# Patient Record
Sex: FEMALE
[De-identification: names, ages, dates, MRNs, and addresses within clinical notes are randomized per-mention and may not be internally consistent; named-entity substitution may affect disease eponyms.]

---

## 2019-07-10 ENCOUNTER — HOSPITAL ENCOUNTER (EMERGENCY)
Dept: HOSPITAL 38 - CC.ED | Age: 59
Discharge: HOME | End: 2019-07-10
Payer: COMMERCIAL

## 2019-07-10 VITALS — DIASTOLIC BLOOD PRESSURE: 67 MMHG | HEART RATE: 103 BPM | SYSTOLIC BLOOD PRESSURE: 104 MMHG

## 2019-07-10 DIAGNOSIS — N39.0: Primary | ICD-10-CM

## 2019-07-10 DIAGNOSIS — Z79.4: ICD-10-CM

## 2019-07-10 DIAGNOSIS — I10: ICD-10-CM

## 2019-07-10 DIAGNOSIS — K21.9: ICD-10-CM

## 2019-07-10 DIAGNOSIS — E03.9: ICD-10-CM

## 2019-07-10 DIAGNOSIS — Z88.5: ICD-10-CM

## 2019-07-10 DIAGNOSIS — Z79.899: ICD-10-CM

## 2019-07-10 DIAGNOSIS — E11.9: ICD-10-CM

## 2019-07-10 LAB
CHLORIDE SERPL-SCNC: 100 MEQ/L (ref 98–106)
SODIUM SERPL-SCNC: 136 MEQ/L (ref 136–145)

## 2019-07-10 PROCEDURE — 99283 EMERGENCY DEPT VISIT LOW MDM: CPT

## 2019-07-10 PROCEDURE — 96361 HYDRATE IV INFUSION ADD-ON: CPT

## 2019-07-10 PROCEDURE — 87186 SC STD MICRODIL/AGAR DIL: CPT

## 2019-07-10 PROCEDURE — 80053 COMPREHEN METABOLIC PANEL: CPT

## 2019-07-10 PROCEDURE — 81001 URINALYSIS AUTO W/SCOPE: CPT

## 2019-07-10 PROCEDURE — 85025 COMPLETE CBC W/AUTO DIFF WBC: CPT

## 2019-07-10 PROCEDURE — 87088 URINE BACTERIA CULTURE: CPT

## 2019-07-10 PROCEDURE — 96365 THER/PROPH/DIAG IV INF INIT: CPT

## 2019-07-10 PROCEDURE — 86140 C-REACTIVE PROTEIN: CPT

## 2019-07-10 PROCEDURE — 87086 URINE CULTURE/COLONY COUNT: CPT

## 2019-07-10 PROCEDURE — 36415 COLL VENOUS BLD VENIPUNCTURE: CPT

## 2019-07-10 PROCEDURE — A4217 STERILE WATER/SALINE, 500 ML: HCPCS

## 2019-07-10 NOTE — EDM.PDOC
ED HPI GENERAL MEDICAL PROBLEM





- General


Chief Complaint: General


Stated Complaint: BLEEDING DURING URINATION/LOWER ABD PAIN


Time Seen by Provider: 07/10/19 14:15


Source of Information: Reports: Patient


History Limitations: Reports: No Limitations





- History of Present Illness


INITIAL COMMENTS - FREE TEXT/NARRATIVE: 





Patient presents to ER with complaints of lower quadrant abdominal pain, 

dysuria with notable hematuria.  Noted burning with urination started about 

1000 this am and over the course of the last 3 hours, has had increasing 

discomfort and more blood.  Voiding frequently.  No nausea.  Does have 

generalized malaise.  Unaware of any fevers.  Denies back pain, fevers or 

headache.  Had EGD done yesterday with a monitor placed to evaluate pH of her 

stomach due to uncontrolled reflux and burning.  States due to this, unable to 

take NSAIDs which she usually takes for discomfort.  


Onset: Today, Sudden


Duration: Hour(s):


Location: Reports: Abdomen


Quality: Reports: Ache, Sharp


Severity: Moderate


Associated Symptoms: Denies: Confusion, Chest Pain, Fever/Chills, Headaches, 

Loss of Appetite, Nausea/Vomiting, Shortness of Breath


  ** Groin


Pain Score (Numeric/FACES): 5





- Related Data


 Allergies











Allergy/AdvReac Type Severity Reaction Status Date / Time


 


codeine Allergy Unknown Cannot Verified 03/17/17 14:43





   Remember  











Home Meds: 


 Home Meds





Albuterol [Ventolin HFA] 1 puff INH BID 12/19/14 [History]


Albuterol [Ventolin HFA] 1 puff INH Q8H PRN 12/19/14 [History]


Bisacodyl [Dulcolax] 5 mg PO DAILY 12/19/14 [History]


Budesonide/Formoterol [Symbicort 160-4.5 Mcg Inhaler] 2 puff INH BID 12/19/14 [

History]


Ferrous Sulfate [Iron] 65 mg PO DAILY 12/19/14 [History]


Levothyroxine [Synthroid] 100 mcg PO DAILY 12/19/14 [History]


Lisinopril 30 mg PO DAILY 12/19/14 [History]


MV,Ca,Min/Iron Fum/FA/Vit K [Multi For Her Tablet] 1 tab PO DAILY 12/19/14 [

History]


Mometasone Furoate [Nasonex Spray] 1 squirt NASBOTH BID 12/19/14 [History]


Omeprazole 20 mg PO DAILY 12/19/14 [History]


Potassium Chloride 10 meq PO DAILY 12/19/14 [History]


Triamcinolone Acetonide [Triamcinolone Acetonide 0.1% Crm] 1 applic TOP BID PRN 

12/19/14 [History]


Venlafaxine HCl [Venlafaxine ER] 150 mg PO DAILY 12/19/14 [History]


metFORMIN [Glucophage] 1,000 mg PO BID 12/19/14 [History]


Colestipol HCl 50 mg PO ASDIRECTED 08/08/16 [History]


Insulin Glargine,Hum.Rec.Anlog [Toujeo Solostar] 20 unit SQ DAILY 08/08/16 [

History]


Montelukast Sodium [Singulair] 100 mg PO DAILY 08/08/16 [History]


Prednisone [IJD: Prednisone] 3 mg PO DAILY 08/08/16 [History]











Past Medical History


HEENT History: Reports: Allergic Rhinitis


Cardiovascular History: Reports: Hypertension


Respiratory History: Reports: Asthma


Gastrointestinal History: Reports: GERD


Endocrine/Metabolic History: Reports: Diabetes, Type II, Hypothyroidism


Oncologic (Cancer) History: Reports: Breast





- Infectious Disease History


Infectious Disease History: Reports: None





- Past Surgical History


HEENT Surgical History: Reports: LASIK


GI Surgical History: Reports: Cholecystectomy


Female  Surgical History: Reports: Breast Biopsy, D&C, Mastectomy





Social & Family History





- Family History


Family Medical History: Noncontributory





- Tobacco Use


Smoking Status *Q: Never Smoker


Second Hand Smoke Exposure: No





ED ROS GENERAL





- Review of Systems


Review Of Systems: See Below


Constitutional: Reports: Malaise.  Denies: Fever, Chills, Weakness, Decreased 

Appetite


HEENT: Reports: No Symptoms


Respiratory: Denies: Shortness of Breath, Cough


Cardiovascular: Denies: Chest Pain, Edema, Lightheadedness


Endocrine: Reports: Fatigue


GI/Abdominal: Reports: Abdominal Pain.  Denies: Constipation, Diarrhea, Nausea


: Reports: Dysuria, Frequency, Hematuria, Urgency


Musculoskeletal: Reports: No Symptoms


Skin: Reports: No Symptoms


Neurological: Reports: No Symptoms


Psychiatric: Reports: No Symptoms





ED EXAM, GENERAL





- Physical Exam


Exam: See Below


Exam Limited By: No Limitations


General Appearance: Alert, WD/WN, No Apparent Distress


Ears: Normal External Exam, Normal TMs


Nose: Normal Inspection, Normal Mucosa, No Blood


Throat/Mouth: Normal Inspection, Normal Oropharynx


Head: Normocephalic


Neck: Normal Inspection, Supple, Non-Tender


Respiratory/Chest: No Respiratory Distress, Lungs Clear, Normal Breath Sounds


Cardiovascular: Regular Rate, Rhythm


GI/Abdominal: Normal Bowel Sounds, Soft, Tender (lower quadrants/suprapubic area

)


Extremities: Normal Inspection, No Pedal Edema


Neurological: Alert, Oriented


Skin Exam: Warm, Dry





Course





- Vital Signs


Last Recorded V/S: 


 Last Vital Signs











Temp  97.5 F   07/10/19 15:55


 


Pulse  103 H  07/10/19 15:55


 


Resp  18   07/10/19 15:55


 


BP  104/67   07/10/19 15:55


 


Pulse Ox  97   07/10/19 15:55














- Orders/Labs/Meds


Orders: 


 Active Orders 24 hr











 Category Date Time Status


 


 CULTURE URINE [RM] Stat Lab  07/10/19 14:10 Received











Labs: 


 Laboratory Tests











  07/10/19 07/10/19 07/10/19 Range/Units





  14:10 14:15 14:15 


 


WBC   18.9 H   (5.0-10.0)  10^3/uL


 


RBC   5.20   (4.00-5.50)  10^6/uL


 


Hgb   15.1   (12.0-16.0)  g/dL


 


Hct   44.7   (37.0-47.0)  %


 


MCV   86.0   (82.0-94.0)  fL


 


MCH   29.0   (27.0-32.0)  pg


 


MCHC   33.8   (33.0-38.0)  g/dL


 


RDW Coeff of Sonia   13.7   (11.0-15.0)  %


 


Plt Count   354   (150-400)  10^3/uL


 


Neut % (Auto)   84.1   (35-85)  %


 


Lymph % (Auto)   9.6 L   (10-55)  %


 


Mono % (Auto)   4.9   (0-16)  %


 


Eos % (Auto)   1.2   (0-5)  %


 


Baso % (Auto)   0.2   (0-3)  %


 


Neut # (Auto)   15.91 H   (1.80-7.00)  10^3/uL


 


Lymph # (Auto)   1.81   (1.00-4.80)  10^3/uL


 


Mono # (Auto)   0.93 H   (0.00-0.80)  10^3/uL


 


Eos # (Auto)   0.22   (0.00-0.45)  10^3/uL


 


Baso # (Auto)   0.03   10^3/uL


 


Sodium    136  (136-145)  mEq/L


 


Potassium    4.6  (3.5-5.0)  mEq/L


 


Chloride    100  ()  mEq/L


 


Carbon Dioxide    24  (21-32)  mmol/L


 


BUN    12  (7-18)  mg/dL


 


Creatinine    0.9  (0.6-1.0)  mg/dL


 


Est Cr Clr Drug Dosing    TNP  


 


Estimated GFR (MDRD)    > 60  (>=60)  mL/min


 


Glucose    206 H D  (75-99)  mg/dL


 


Calcium    10.0  (8.4-10.1)  mg/dL


 


Total Bilirubin    0.4  (0.0-1.0)  mg/dL


 


AST    23  (15-37)  U/L


 


ALT    37  (12-78)  U/L


 


Alkaline Phosphatase    92  ()  U/L


 


C-Reactive Protein    < 0.2 L  (0.2-0.8)  mg/dL


 


Total Protein    7.9  (6.4-8.2)  g/dL


 


Albumin    3.9  (3.4-5.0)  g/dL


 


Urine Color  Red    (YELLOW)  


 


Urine Appearance  Cloudy    (CLEAR)  


 


Urine pH  8.5 H    (4.5-8.0)  


 


Ur Specific Gravity  1.020    (1.003-1.020)  


 


Urine Protein  >=300 H    (NEGATIVE)  mg/dL


 


Urine Glucose (UA)  100 H    (NEGATIVE)  mg/dL


 


Urine Ketones  40 H    (NEGATIVE)  mg/dL


 


Urine Occult Blood  Large H    (NEGATIVE)  


 


Urine Nitrite  Positive H    (NEGATIVE)  


 


Urine Bilirubin  Negative    (NEGATIVE)  


 


Urine Urobilinogen  4.0 H    (0.2-1.0)  EU/dL


 


Ur Leukocyte Esterase  Large H    (NEGATIVE)  


 


Urine RBC  Packed H    (0-5)  /HPF


 


Urine WBC  Semi-packed H    (0-5)  /HPF


 


Ur Squamous Epith Cells  Few H    (NOT SEEN)  /HPF


 


Urine Bacteria  Moderate H    (NOT SEEN)  /HPF


 


Urinalysis Comment      











Meds: 


Medications














Discontinued Medications














Generic Name Dose Route Start Last Admin





  Trade Name Freq  PRN Reason Stop Dose Admin


 


Levofloxacin/Dextrose 500 mg/  100 mls @ 100 mls/hr  07/10/19 14:53  07/10/19 15

:11





  Premix  IV  07/10/19 15:52  100 mls/hr





  ONETIME ONE   Administration





     





     





     





     


 


Sodium Chloride  1,000 mls @ 150 mls/hr  07/10/19 15:00  07/10/19 15:11





  Normal Saline  IV   150 mls/hr





  ASDIRECTED GRADY   Administration





     





     





     





     


 


Tramadol HCl  50 mg  07/10/19 14:48  07/10/19 15:11





  Ultram  PO  07/10/19 14:49  50 mg





  ONETIME ONE   Administration





     





     





     





     














- Re-Assessments/Exams


Free Text/Narrative Re-Assessment/Exam: 





07/10/19


WBC high 18.9, CRP negative.  Electrolytes, BUN and creatinine normal.  Urine 

is packed with bacteria, leukocytes, blood.  IV started.  Given first dose of 

IV Levaquin.





1700-Patient feeling better.  Was given tramadol for pain.  Discharge home, 

continue on Levaquin PO daily.  





Departure





- Departure


Time of Disposition: 16:54


Disposition: Home, Self-Care 01


Condition: Fair


Clinical Impression: 


 UTI, Urinary tract infectious disease








- Discharge Information


*PRESCRIPTION DRUG MONITORING PROGRAM REVIEWED*: No


*COPY OF PRESCRIPTION DRUG MONITORING REPORT IN PATIENT CASI: No


Referrals: 


Butch Batista MD [Primary Care Provider] - 


Forms:  ED Department Discharge


Additional Instructions: 


1.  Push fluids


2.  Tylenol for discomfort


3.  Levaquin 500 mg daily with food


4.  Recheck urine in 14 days after completion of treatment.


5.  Follow up with primary care provider for ongoing concerns.





- My Orders


Last 24 Hours: 


My Active Orders





07/10/19 14:10


CULTURE URINE [RM] Stat 














- Assessment/Plan


Last 24 Hours: 


My Active Orders





07/10/19 14:10


CULTURE URINE [RM] Stat

## 2020-08-28 NOTE — OR
DATE OF OPERATION:  08/28/2020

 

PREOPERATIVE DIAGNOSIS:  HISTORY OF POLYPS.

 

POSTOPERATIVE DIAGNOSIS:  HISTORY OF POLYPS.

 

SURGEON:  Butch Batista MD

 

PROCEDURE:  FULL-LENGTH COLONOSCOPY WITH SNARE POLYPECTOMY X4, FORCEPS POLYP

REMOVAL X6.

 

ANESTHESIA:  MAC.

 

COMPLICATIONS:  None.

 

SPECIMEN:  Ten separate polyps, see report.

 

FINDINGS:

1. Full-length colonoscopy.

2. Mild pan diverticulosis.

3. Tubular adenomas x6.

4. Villous adenomas x4.

 

RECOMMENDATIONS:  Followup colonoscopy in 1 year.

 

INDICATIONS:  The patient has a distant history of prior polyps, is overdue for

a followup colonoscopy.

 

DESCRIPTION OF PROCEDURE:  The patient was prepped and draped, placed in the

left lateral decubitus position.  A lubricated Olympus colonoscope was inserted

and easily advanced to the cecum. We were able to directly visualize the

ileocecal valve, palpate and visualize the light in the right lower quadrant.

The bowel prep was adequate.  Upon withdrawal, the cecal pouch appeared benign.

On the backside of the cecal pouch, the patient had 3 separate polyps, 2 were

small tubular adenomas, 1 was a large villous lesion.  The 2 tubular ones were

removed with a forceps.  The 3rd one was removed in 2 separate pieces via snare

and suctioned into polyp trap.  In the mid ascending colon, the patient had a

2nd larger villous lesion also removed with a snare without any complication.

Just before the hepatic flexure in the distal ascending colon, the patient had a

5th right-sided polyp removed with a forceps without any complication.  Most of

the transverse colon appeared benign.  Right past the splenic flexure, the

patient had 2 polyps, each removed with snare without any difficulty.  Both were

suctioned into polyp traps without complication.  The descending colon otherwise

was benign.  In the mid sigmoid area around 50 cm, the patient had a cluster of

3 small sessile tubular adenomas, each were removed with a forceps without any

difficulty.  As per the notes, the patient does have pan diverticular disease,

most significant in the sigmoid, but mild throughout.  The rectal vault appeared

benign.  Retroflexion of the scope showed no anal lesions.  Air was suctioned,

scope removed without complication.

 

ENRICO/NURIA

DD:  08/28/2020 09:38:43

DT:  08/28/2020 11:00:35

Job #:  [id number]

## 2021-10-01 NOTE — OR
DATE OF OPERATION:  10/01/2021

 

PREOPERATIVE DIAGNOSIS:  HISTORY OF POLYPS.

 

POSTOPERATIVE DIAGNOSIS:  HISTORY OF POLYPS.

 

SURGEON:  Butch Batista MD

 

PROCEDURE:  FULL-LENGTH DIAGNOSTIC COLONOSCOPY.

 

ANESTHESIA:  MAC.

 

COMPLICATIONS:  None.

 

SPECIMEN:  None.

 

FINDINGS:

1. Full-length colonoscopy.

2. Mild pandiverticulosis.

3. No polyp recurrence.

 

RECOMMENDATIONS:  Followup colonoscopy in 3 years.

 

INDICATIONS:  The patient had a colonoscopy last year with 10 polyps removed.

She is in for a 1-year followup.

 

DESCRIPTION OF PROCEDURE:  The patient was prepped and draped, placed in the

left lateral decubitus position.  A lubricated Olympus colonoscope was inserted

and easily advanced to the cecum. Direct visualization of the ileocecal valve

and appendiceal orifice was accomplished.  The bowel prep was adequate.  Upon

withdrawal of the scope throughout the entire length of the colon, I could find

no polyps, masses, ulceration, or bleeding sites.  No vascular abnormalities or

signs of colitis.  Pandiverticulosis was seen, mild throughout.  No inflammatory

changes.  Rectal vault was unremarkable.  Retroflexion showed no perianal

lesions.  Air was suctioned.  The scope removed without complication.

 

ENRICO/NURIA

DD:  10/01/2021 07:54:06

DT:  10/01/2021 11:55:15

Job #:  [id number]